# Patient Record
Sex: FEMALE | Race: BLACK OR AFRICAN AMERICAN | Employment: UNEMPLOYED | URBAN - METROPOLITAN AREA
[De-identification: names, ages, dates, MRNs, and addresses within clinical notes are randomized per-mention and may not be internally consistent; named-entity substitution may affect disease eponyms.]

---

## 2024-02-14 ENCOUNTER — OFFICE VISIT (OUTPATIENT)
Dept: PRIMARY CARE CLINIC | Age: 69
End: 2024-02-14
Payer: COMMERCIAL

## 2024-02-14 VITALS
WEIGHT: 186.6 LBS | RESPIRATION RATE: 15 BRPM | TEMPERATURE: 99.8 F | OXYGEN SATURATION: 98 % | HEIGHT: 64 IN | DIASTOLIC BLOOD PRESSURE: 72 MMHG | SYSTOLIC BLOOD PRESSURE: 138 MMHG | HEART RATE: 77 BPM | BODY MASS INDEX: 31.86 KG/M2

## 2024-02-14 DIAGNOSIS — Z13.220 SCREENING CHOLESTEROL LEVEL: ICD-10-CM

## 2024-02-14 DIAGNOSIS — Z12.11 SPECIAL SCREENING FOR MALIGNANT NEOPLASM OF COLON: ICD-10-CM

## 2024-02-14 DIAGNOSIS — J45.20 MILD INTERMITTENT EXTRINSIC ASTHMA WITHOUT COMPLICATION: Primary | ICD-10-CM

## 2024-02-14 DIAGNOSIS — Z11.59 ENCOUNTER FOR HEPATITIS C SCREENING TEST FOR LOW RISK PATIENT: ICD-10-CM

## 2024-02-14 DIAGNOSIS — Z13.1 DIABETES MELLITUS SCREENING: ICD-10-CM

## 2024-02-14 PROBLEM — J45.909 EXTRINSIC ASTHMA: Status: ACTIVE | Noted: 2024-02-14

## 2024-02-14 PROCEDURE — 1123F ACP DISCUSS/DSCN MKR DOCD: CPT | Performed by: FAMILY MEDICINE

## 2024-02-14 PROCEDURE — 99203 OFFICE O/P NEW LOW 30 MIN: CPT | Performed by: FAMILY MEDICINE

## 2024-02-14 RX ORDER — ASCORBIC ACID 500 MG
500 TABLET ORAL DAILY
COMMUNITY

## 2024-02-14 RX ORDER — B-COMPLEX WITH VITAMIN C
TABLET ORAL
COMMUNITY

## 2024-02-14 RX ORDER — ASPIRIN 81 MG/1
162 TABLET, CHEWABLE ORAL DAILY
COMMUNITY
End: 2024-02-14

## 2024-02-14 RX ORDER — CETIRIZINE HYDROCHLORIDE 10 MG/1
10 TABLET ORAL DAILY
COMMUNITY

## 2024-02-14 RX ORDER — ALBUTEROL SULFATE 90 UG/1
2 AEROSOL, METERED RESPIRATORY (INHALATION) EVERY 6 HOURS PRN
Qty: 18 G | Refills: 3 | Status: SHIPPED | OUTPATIENT
Start: 2024-02-14

## 2024-02-14 NOTE — PROGRESS NOTES
mouth daily, Disp: , Rfl:     Zinc 100 MG TABS, Take by mouth, Disp: , Rfl:     selenium 50 MCG tablet, Take 1 tablet by mouth daily, Disp: , Rfl:     Potassium 99 MG TABS, Take by mouth, Disp: , Rfl:     Magnesium 400 MG CAPS, Take by mouth, Disp: , Rfl:     Garlic 1000 MG CAPS, Take by mouth, Disp: , Rfl:     Folic Acid (FOLATE PO), Take by mouth, Disp: , Rfl:     albuterol sulfate HFA (PROVENTIL HFA) 108 (90 Base) MCG/ACT inhaler, Inhale 2 puffs into the lungs every 6 hours as needed for Wheezing, Disp: 18 g, Rfl: 3  Allergies   Allergen Reactions    Penicillins Anaphylaxis and Shortness Of Breath    Lactose Intolerance (Gi) Nausea Only     Review of Systems:  Review of Systems   Constitutional:  Negative for chills, diaphoresis and fever.   HENT:  Negative for congestion and rhinorrhea.    Respiratory:  Negative for cough and shortness of breath.    Cardiovascular:  Negative for chest pain.   Gastrointestinal:  Negative for abdominal pain, blood in stool, diarrhea, nausea and vomiting.   Genitourinary:  Negative for difficulty urinating, dysuria and hematuria.   Skin:  Negative for color change and rash.   Psychiatric/Behavioral:  Negative for dysphoric mood.      Objective:    Physical Exam:  Vitals:    02/14/24 1334   BP: 138/72   Site: Left Upper Arm   Position: Sitting   Cuff Size: Medium Adult   Pulse: 77   Resp: 15   Temp: 99.8 °F (37.7 °C)   TempSrc: Temporal   SpO2: 98%   Weight: 84.6 kg (186 lb 9.6 oz)   Height: 1.626 m (5' 4\")     Physical Exam  Constitutional:       General: She is not in acute distress.     Appearance: Normal appearance. She is not ill-appearing, toxic-appearing or diaphoretic.   HENT:      Head: Normocephalic and atraumatic.      Right Ear: External ear normal.      Left Ear: External ear normal.   Eyes:      General: No scleral icterus.  Cardiovascular:      Rate and Rhythm: Normal rate and regular rhythm.      Pulses: Normal pulses.      Heart sounds: Normal heart sounds. No

## 2024-02-14 NOTE — ASSESSMENT & PLAN NOTE
Well controlled. Denied recent H/I/E. Refilled albuterol inhaler. Offered flu and PCV20 vaccines - declined. Advised can always get this in the future. Call back/ED precautions discussed.

## 2024-02-14 NOTE — PATIENT INSTRUCTIONS
Please find our [facility] Lab Location Guide below for your convenience!    CENTRAL LOCATIONS    1) [facility]  [address]  [place], [place] [address]  Phone: [phone]    2) [facility]  [address], [place] [address]  Phone: [phone]    [facility] LOCATIONS    3) [facility]  [address]  [place], [place] [address]  Phone: [phone]    4) [facility] [place] [facility]  [address] [place] [address]  Phone: [phone]    5) [facility]  [address] [place] [address]  Phone: [phone]    [place] LOCATIONS    6) [facility]  [address]  [place] [address]  Phone: [phone]    7) [facility]  [address] [place] [address]  Phone: [phone]    8) [facility]  [address] [address]  Phone: [phone]    9) [facility]  [address]  [place] [address]  Phone: [phone]    [place] LOCATIONS    10) [facility]  [address]  [place] [address]  Phone: [phone]    11) [facility]  [address]  [place] [address]  Phone: [phone]    [facility] LOCATIONS    12) [facility]  [address]  [place] [address]  Phone: [phone]    13) [facility]  [address] [place] [address]  Phone: [phone]

## 2024-03-04 ENCOUNTER — PREP FOR PROCEDURE (OUTPATIENT)
Dept: SURGERY | Age: 69
End: 2024-03-04

## 2024-03-04 ENCOUNTER — TELEPHONE (OUTPATIENT)
Dept: SURGERY | Age: 69
End: 2024-03-04

## 2024-03-04 DIAGNOSIS — Z12.11 SPECIAL SCREENING FOR MALIGNANT NEOPLASMS, COLON: ICD-10-CM

## 2024-03-04 RX ORDER — POLYETHYLENE GLYCOL 3350, SODIUM SULFATE ANHYDROUS, SODIUM BICARBONATE, SODIUM CHLORIDE, POTASSIUM CHLORIDE 236; 22.74; 6.74; 5.86; 2.97 G/4L; G/4L; G/4L; G/4L; G/4L
4 POWDER, FOR SOLUTION ORAL ONCE
Qty: 4000 ML | Refills: 0 | Status: SHIPPED | OUTPATIENT
Start: 2024-03-04 | End: 2024-03-04

## 2024-03-04 NOTE — TELEPHONE ENCOUNTER
Patient has been scheduled for: Colonoscopy    Procedure: Colonoscopy  Date: 4/17/24  Time: 11:30am  Arrival:10:00am  Hospital: [facility]    ASA?: N/A  Prep? [facility]    Pre-op? N/A    Post-op Appt? N/A      Patient advised they will need a .    Orders faxed to surgery scheduling.    Medication sent to Pharmacy: [facility]    Stents or ostomy marking? N/A    Instructions have been mailed/emailed to:[address] [place] [id number]     Added to outlook calendar

## 2024-03-15 PROBLEM — Z13.220 SCREENING CHOLESTEROL LEVEL: Status: RESOLVED | Noted: 2024-02-14 | Resolved: 2024-03-15

## 2024-03-15 PROBLEM — Z13.1 DIABETES MELLITUS SCREENING: Status: RESOLVED | Noted: 2024-02-14 | Resolved: 2024-03-15

## 2024-03-15 PROBLEM — Z12.11 SPECIAL SCREENING FOR MALIGNANT NEOPLASM OF COLON: Status: RESOLVED | Noted: 2024-02-14 | Resolved: 2024-03-15

## 2024-03-15 PROBLEM — Z11.59 ENCOUNTER FOR HEPATITIS C SCREENING TEST FOR LOW RISK PATIENT: Status: RESOLVED | Noted: 2024-02-14 | Resolved: 2024-03-15

## 2024-04-03 PROBLEM — Z12.11 SPECIAL SCREENING FOR MALIGNANT NEOPLASMS, COLON: Status: RESOLVED | Noted: 2024-03-04 | Resolved: 2024-04-03

## 2024-04-16 ENCOUNTER — TELEPHONE (OUTPATIENT)
Dept: PRIMARY CARE CLINIC | Age: 69
End: 2024-04-16

## 2024-04-16 ENCOUNTER — TELEPHONE (OUTPATIENT)
Dept: SURGERY | Age: 69
End: 2024-04-16

## 2024-04-16 NOTE — TELEPHONE ENCOUNTER
Dr. Griffith's office called in stating that the patient is being asked to pay a large copay for the colonoscopy for tomorrow even though it is supposed to be a preventative screening    Dr. Griffith would like our office to contact the patient because she is having second thoughts about the colonoscopy and is thinking about holding off on the procedure.     Patient is supposed to have her colonoscopy tomorrow 4/17/2024    Please contact the patient at [phone]

## 2024-04-16 NOTE — TELEPHONE ENCOUNTER
Patient called back is all good to proceed with colonoscopy on 4/17. She declined coming in earlier and will arrive at 10:00 am.    no

## 2024-04-16 NOTE — TELEPHONE ENCOUNTER
Pt called stating she is supposed to have a colonoscopy tomorrow with Dr Moore but sts she has to pay quite a bit out of pocket and is wondering why.  Pt sts she would like a recommendation for another GI doctor to see if that provider and exam would be covered 100%.  Pt sts must have an answer ASAP for she is supposed to start the prep for the colonoscopy today.  Advised pt that Dr Griffith is not available at this time and we will reach out to her as soon as possible and call pt back.    Advised pt to call her insurance to see why she would owe that much money for the procedure.

## 2024-04-16 NOTE — TELEPHONE ENCOUNTER
Spoke with patient to confirm colonoscopy. She is ready except regarding the cost she will have to pay. She was given number to patient billing to confirm there will be no additional fees to initial cost  she was given. If there is no additional costs then she will proceed, if there are more costs then she will have to cancel. Patient will call office back to confirm what she will be doing.

## 2024-04-17 ENCOUNTER — HOSPITAL ENCOUNTER (OUTPATIENT)
Age: 69
Setting detail: OUTPATIENT SURGERY
Discharge: HOME | End: 2024-04-17
Attending: SURGERY | Admitting: SURGERY
Payer: COMMERCIAL

## 2024-04-17 ENCOUNTER — ANESTHESIA EVENT (OUTPATIENT)
Dept: ENDOSCOPY | Age: 69
End: 2024-04-17
Payer: COMMERCIAL

## 2024-04-17 ENCOUNTER — ANESTHESIA (OUTPATIENT)
Dept: ENDOSCOPY | Age: 69
End: 2024-04-17
Payer: COMMERCIAL

## 2024-04-17 VITALS
HEIGHT: 64 IN | TEMPERATURE: 97.2 F | HEART RATE: 93 BPM | SYSTOLIC BLOOD PRESSURE: 165 MMHG | OXYGEN SATURATION: 99 % | RESPIRATION RATE: 12 BRPM | BODY MASS INDEX: 30.39 KG/M2 | DIASTOLIC BLOOD PRESSURE: 97 MMHG | WEIGHT: 178 LBS

## 2024-04-17 DIAGNOSIS — Z12.11 SPECIAL SCREENING FOR MALIGNANT NEOPLASMS, COLON: Primary | ICD-10-CM

## 2024-04-17 PROCEDURE — 3700000001 HC ADD 15 MINUTES (ANESTHESIA): Performed by: SURGERY

## 2024-04-17 PROCEDURE — 45378 DIAGNOSTIC COLONOSCOPY: CPT | Performed by: SURGERY

## 2024-04-17 PROCEDURE — 3609008400 HC SIGMOIDOSCOPY DIAGNOSTIC: Performed by: SURGERY

## 2024-04-17 PROCEDURE — 2580000003 HC RX 258: Performed by: ANESTHESIOLOGY

## 2024-04-17 PROCEDURE — 3700000000 HC ANESTHESIA ATTENDED CARE: Performed by: SURGERY

## 2024-04-17 PROCEDURE — 7100000011 HC PHASE II RECOVERY - ADDTL 15 MIN: Performed by: SURGERY

## 2024-04-17 PROCEDURE — 6360000002 HC RX W HCPCS: Performed by: NURSE ANESTHETIST, CERTIFIED REGISTERED

## 2024-04-17 PROCEDURE — 7100000010 HC PHASE II RECOVERY - FIRST 15 MIN: Performed by: SURGERY

## 2024-04-17 RX ORDER — LIDOCAINE HYDROCHLORIDE 20 MG/ML
INJECTION, SOLUTION INTRAVENOUS PRN
Status: DISCONTINUED | OUTPATIENT
Start: 2024-04-17 | End: 2024-04-17 | Stop reason: SDUPTHER

## 2024-04-17 RX ORDER — PROPOFOL 10 MG/ML
INJECTION, EMULSION INTRAVENOUS PRN
Status: DISCONTINUED | OUTPATIENT
Start: 2024-04-17 | End: 2024-04-17 | Stop reason: SDUPTHER

## 2024-04-17 RX ORDER — SODIUM CHLORIDE, SODIUM LACTATE, POTASSIUM CHLORIDE, CALCIUM CHLORIDE 600; 310; 30; 20 MG/100ML; MG/100ML; MG/100ML; MG/100ML
INJECTION, SOLUTION INTRAVENOUS CONTINUOUS
Status: DISCONTINUED | OUTPATIENT
Start: 2024-04-17 | End: 2024-04-17 | Stop reason: HOSPADM

## 2024-04-17 RX ADMIN — PROPOFOL 40 MG: 10 INJECTION, EMULSION INTRAVENOUS at 12:02

## 2024-04-17 RX ADMIN — PROPOFOL 50 MG: 10 INJECTION, EMULSION INTRAVENOUS at 12:05

## 2024-04-17 RX ADMIN — PROPOFOL 125 MCG/KG/MIN: 10 INJECTION, EMULSION INTRAVENOUS at 12:04

## 2024-04-17 RX ADMIN — SODIUM CHLORIDE, POTASSIUM CHLORIDE, SODIUM LACTATE AND CALCIUM CHLORIDE: 600; 310; 30; 20 INJECTION, SOLUTION INTRAVENOUS at 11:04

## 2024-04-17 RX ADMIN — PROPOFOL 80 MG: 10 INJECTION, EMULSION INTRAVENOUS at 11:59

## 2024-04-17 RX ADMIN — LIDOCAINE HYDROCHLORIDE 100 MG: 20 INJECTION, SOLUTION INTRAVENOUS at 11:59

## 2024-04-17 ASSESSMENT — PAIN SCALES - GENERAL
PAINLEVEL_OUTOF10: 0

## 2024-04-17 ASSESSMENT — PAIN - FUNCTIONAL ASSESSMENT: PAIN_FUNCTIONAL_ASSESSMENT: NONE - DENIES PAIN

## 2024-04-17 NOTE — PROGRESS NOTES
From ENDO post procedure PROCEDURES  SIGMOIDOSCOPY DIAGNOSTIC FLEXIBLE  Raúl Stover MD  Out of OR, ENDO   Report received from ENDO RN at bedside.   VS stable.   Patient denies any pain or nausea.    at bedside- Dr. Stover called and left message with family.

## 2024-04-17 NOTE — TELEPHONE ENCOUNTER
Per Dr Stover's office, their office called and spoke with pt.  All questions answered and pt will proceed with having colonoscopy performed.

## 2024-04-17 NOTE — ANESTHESIA PRE PROCEDURE
COVID-19 2021    IBS (irritable bowel syndrome)     Macular hole     Left - sees eye doctor this       Past Surgical History:        Procedure Laterality Date    APPENDECTOMY       SECTION      X3    ENDOMETRIAL ABLATION      For heavy bleeding    REFRACTIVE SURGERY         Social History:    Social History     Tobacco Use    Smoking status: Former     Current packs/day: 0.00     Average packs/day: 0.3 packs/day for 7.0 years (2.1 ttl pk-yrs)     Types: Cigarettes     Start date:      Quit date:      Years since quittin.3    Smokeless tobacco: Never   Substance Use Topics    Alcohol use: Not Currently     Comment: 2 shots of Scotch per week                                Counseling given: Not Answered      Vital Signs (Current): There were no vitals filed for this visit.                                           BP Readings from Last 3 Encounters:   24 138/72       NPO Status:                                                                                 BMI:   Wt Readings from Last 3 Encounters:   24 84.6 kg (186 lb 9.6 oz)     There is no height or weight on file to calculate BMI.    CBC: No results found for: \"WBC\", \"RBC\", \"HGB\", \"HCT\", \"MCV\", \"RDW\", \"PLT\"    CMP: No results found for: \"NA\", \"K\", \"CL\", \"CO2\", \"BUN\", \"CREATININE\", \"GFRAA\", \"AGRATIO\", \"LABGLOM\", \"GLUCOSE\", \"GLU\", \"PROT\", \"CALCIUM\", \"BILITOT\", \"ALKPHOS\", \"AST\", \"ALT\"    POC Tests: No results for input(s): \"POCGLU\", \"POCNA\", \"POCK\", \"POCCL\", \"POCBUN\", \"POCHEMO\", \"POCHCT\" in the last 72 hours.    Coags: No results found for: \"PROTIME\", \"INR\", \"APTT\"    HCG (If Applicable): No results found for: \"PREGTESTUR\", \"PREGSERUM\", \"HCG\", \"HCGQUANT\"     ABGs: No results found for: \"PHART\", \"PO2ART\", \"HZK2TDM\", \"QLO7UAX\", \"BEART\", \"J8HHRUAQ\"     Type & Screen (If Applicable):  No results found for: \"LABABO\", \"LABRH\"    Drug/Infectious Status (If Applicable):  No results found for: \"HIV\", \"HEPCAB\"    COVID-19 Screening (If

## 2024-04-17 NOTE — H&P
SECTION      X3    ENDOMETRIAL ABLATION      For heavy bleeding    REFRACTIVE SURGERY         Physical Exam:     BP (!) 153/75   Pulse 67   Temp 97.7 °F (36.5 °C) (Temporal)   Resp 17   Ht 1.626 m (5' 4\")   Wt 80.7 kg (178 lb)   SpO2 100%   BMI 30.55 kg/m²  Body mass index is 30.55 kg/m².  Constitutional: Appears well-developed and well-nourished. Grooming appropriate.  Head: Normocephalic, atraumatic.   Eyes: No scleral icterus. Vision intact grossly.  ENT: Hearing grossly intact. No facial deformity.  Cardiovascular: Normal rate on monitor.  Pulmonary/Chest: Effort normal. No respiratory distress. No wheezes. No use of accessory muscles.  Musculoskeletal: Normal range of motion of UE. No gross deformity.   Neurological: Alert and oriented to person, place, and time. No gross deficits.  Psychiatric: Normal mood and affect. Behavior normal. Oriented to person, place, and time.  Abdomen: soft, NTTP, non distended    Recent labs/radiology reviewed as appropriate    Assessment/Plan:     Previous colonoscopy: yes - 6 yrs ago - (+) polyp  Family history of colorectal cancer: no  Symptoms: no    Anesthesia to provide sedation. Please see their documentation regarding airway and ASA classification.    Proceed as planned for endoscopy, possible polypectomy    Risks/benefits/alternatives of procedure discussed with patient and any present family members. Risks including, but not limited to: bleeding, perforation, post polypectomy syndrome, splenic injury, need for additional procedures or surgery, risks of anesthesia. Patient understands it is their responsibility to call office for pathology results if they do not hear from my office within 1-2 weeks. All questions answered.    Electronically signed by Raúl Stover MD on 2024 at 11:58 AM

## 2024-04-17 NOTE — ANESTHESIA POSTPROCEDURE EVALUATION
Department of Anesthesiology  Postprocedure Note    Patient: Nargis Davis  MRN: [id number]  YOB: 1955  Date of evaluation: 4/17/2024    Procedure Summary       Date: 04/17/24 Room / Location: [place] [address] / [facility]    Anesthesia Start: 1155 Anesthesia Stop: 1213    Procedure: SIGMOIDOSCOPY DIAGNOSTIC FLEXIBLE Diagnosis:       Special screening for malignant neoplasms, colon      (Special screening for malignant neoplasms, colon [Z12.11])    Surgeons: Raúl Stoevr MD Responsible Provider: Hernando Aquino MD    Anesthesia Type: MAC ASA Status: 2            Anesthesia Type: No value filed.    Sumeet Phase I: Sumeet Score: 10    Sumeet Phase II: Sumeet Score: 10    Anesthesia Post Evaluation    Patient location during evaluation: PACU  Patient participation: complete - patient participated  Level of consciousness: awake  Pain score: 0  Airway patency: patent  Nausea & Vomiting: no nausea  Cardiovascular status: hemodynamically stable  Respiratory status: acceptable  Hydration status: stable  Pain management: satisfactory to patient    No notable events documented.

## 2024-04-17 NOTE — PROGRESS NOTES
Discharge instructions given to patient and  at beside.   Patient instructed to follow up with scheduling a CT scan at [facility] facility and officew can assist with scheduling. Patient and family voices understanding of follow up care.     IV dc/d, dressed and discharged home vi wc to car-  driving her home.

## 2024-04-18 ENCOUNTER — TELEPHONE (OUTPATIENT)
Dept: SURGERY | Age: 69
End: 2024-04-18

## 2024-04-18 NOTE — TELEPHONE ENCOUNTER
Spoke to [facility] to see if they do virtual colonoscopies, they do them but only at the [place] location. Order faxed to [email or web address]. Patient notified order has been faxed and to call Dr. Stover's office if [facility] hasn't reached out by next Wednesday.

## 2024-04-18 NOTE — TELEPHONE ENCOUNTER
Patient called stating yesterday Dr. Stover instructed her to have a CT done, she called [facility] to schedule and they do not have an order for her.

## 2024-04-30 ENCOUNTER — TELEPHONE (OUTPATIENT)
Dept: SURGERY | Age: 69
End: 2024-04-30

## 2024-04-30 ENCOUNTER — OFFICE VISIT (OUTPATIENT)
Dept: SURGERY | Age: 69
End: 2024-04-30
Payer: COMMERCIAL

## 2024-04-30 VITALS
RESPIRATION RATE: 16 BRPM | HEART RATE: 66 BPM | SYSTOLIC BLOOD PRESSURE: 152 MMHG | BODY MASS INDEX: 31.41 KG/M2 | OXYGEN SATURATION: 100 % | TEMPERATURE: 98.2 F | DIASTOLIC BLOOD PRESSURE: 79 MMHG | WEIGHT: 183 LBS

## 2024-04-30 DIAGNOSIS — K60.2 ANAL FISSURE: Primary | ICD-10-CM

## 2024-04-30 DIAGNOSIS — Z12.11 SPECIAL SCREENING FOR MALIGNANT NEOPLASMS, COLON: ICD-10-CM

## 2024-04-30 PROCEDURE — 99204 OFFICE O/P NEW MOD 45 MIN: CPT | Performed by: SURGERY

## 2024-04-30 PROCEDURE — 1123F ACP DISCUSS/DSCN MKR DOCD: CPT | Performed by: SURGERY

## 2024-04-30 NOTE — PROGRESS NOTES
EXAM:    Chaperone/MA present in room during entire exam. Patient was placed in lateral or knee-chest positioning depending on comfort. Exam table manipulated for proper visualization and lighting. Buttocks spread.     Inspection reveals: posterior midline fissure confirmed by cotton tip swab palpation    Digital exam and anoscopy deferred due to acute pain      Labs: none  Radiology: none    Last colonoscopy: FLex sig 2 wks ago    Date of last Colonoscopy: 4/17/2024   No cologuard on file  No FIT/FOBT on file   Date of last flexible sigmoidoscopy: 4/17/2024       Assessment/Plan:     A/P:  New problem(s): Anal fissure  Established problem(s): Aborted colonoscopy screening due to tortuous sigmoid colon  Additional workup/treatment planned: Medical therapy with prescription calcium channel blocker ointment, fiber supplementation, sitz baths, improving dietary and lifestyle choices  Risk of complications/morbidity: moderate    Patient has signs and symptoms consistent with anal fissure.  Exam reveals posterior midline acute anal fissure with internal sphincter spasm.  We will start with conservative management, including fiber supplementation, water, healthy fruits and vegetables, and medical management with prescription topical calcium channel blocker ointment.  Discussed the use of the prescription ointment and that it will need to be obtained from a compounding pharmacy, which my office will arrange.  If symptoms do not improve in 6-8 weeks, patient may require more invasive treatment options, such as Botox injection, partial sphincterotomy, or fissurectomy with anocutaneous advancement flap. We briefly discussed operative risks of these various options.    She also has a CT colonoscopy scheduled in the next few weeks, so hopefully her symptoms will be improved by then in order to tolerate the bowel prep.    I provided written information in the After Visit Summary AVS Regarding: Anal fissure    DISPOSITION:  F/u

## 2024-04-30 NOTE — PATIENT INSTRUCTIONS
Anal Fissure: Information and Care Instructions        An anal fissure is a tear in the lining of the anus. It typically causes intense, sharp pain and a small amount of bleeding. You may notice bright red blood on the toilet paper after you wipe. A fissure may form if you are constipated and try to pass a large, hard stool, or have excessive diarrhea. Some fissures form despite regular, soft stools. Some are due to trauma. Rarely, fissures can be a sign of other diseases such as Crohn's disease, infections, or cancer.    In 50% of patients, anal fissure will heal by addressing the underlying problem and avoiding additional hard stool and constipation. See below for recommendations.    In the other 50% of patients, anal fissures are resistant to healing due to spasm (abnormal tightening) of the internal sphincter muscle. This part of the anal muscles is under automatic control, so you may not feel the spasm. Most treatments attempt to break the cycle of spasm and pain by relaxing the internal sphincter muscle. This can be done with medication, Botox injection, and occasionally with surgery.     Anal fissures themselves do not lead to cancer or other serious illnesses, however undiagnosed rectal bleeding can be a sign of other problems in the colon and rectum, such as hemorrhoids, infections, polyps, or even cancers. If bleeding is excessive, mixed with stool, or ongoing after healing of the fissure, or you have a family history of cancer, colonoscopy may be recommended.    How to treat constipation and avoid straining:  Avoid constipation:  Include fruits, vegetables, beans, and whole grains in your diet each day. These foods are high in fiber.  Take a fiber supplement, such as Benefiber, Citrucel, or Metamucil, every day. Read and follow all instructions on the label.   Drink plenty of fluids, enough so that your urine is light yellow or clear like water. If you have kidney, heart, or liver disease and have to

## 2024-04-30 NOTE — TELEPHONE ENCOUNTER
Spoke to Pharmacist at [facility] to order CCB ointment consisting of Nifedipine 0.3%/Lidocaine 5% 30 grams with instructions to apply a pea size amount BID and 2 additional refills.

## 2024-05-06 DIAGNOSIS — Z13.220 SCREENING CHOLESTEROL LEVEL: ICD-10-CM

## 2024-05-06 DIAGNOSIS — Z13.1 DIABETES MELLITUS SCREENING: ICD-10-CM

## 2024-05-06 DIAGNOSIS — Z11.59 ENCOUNTER FOR HEPATITIS C SCREENING TEST FOR LOW RISK PATIENT: ICD-10-CM

## 2024-05-07 LAB
ALBUMIN SERPL-MCNC: 4.5 G/DL (ref 3.4–5)
ALBUMIN/GLOB SERPL: 1.6 {RATIO} (ref 1.1–2.2)
ALP SERPL-CCNC: 86 U/L (ref 40–129)
ALT SERPL-CCNC: 22 U/L (ref 10–40)
ANION GAP SERPL CALCULATED.3IONS-SCNC: 12 MMOL/L (ref 3–16)
AST SERPL-CCNC: 20 U/L (ref 15–37)
BILIRUB SERPL-MCNC: <0.2 MG/DL (ref 0–1)
BUN SERPL-MCNC: 11 MG/DL (ref 7–20)
CALCIUM SERPL-MCNC: 10.3 MG/DL (ref 8.3–10.6)
CHLORIDE SERPL-SCNC: 103 MMOL/L (ref 99–110)
CHOLEST SERPL-MCNC: 327 MG/DL (ref 0–199)
CO2 SERPL-SCNC: 26 MMOL/L (ref 21–32)
CREAT SERPL-MCNC: 0.8 MG/DL (ref 0.6–1.2)
EST. AVERAGE GLUCOSE BLD GHB EST-MCNC: 105.4 MG/DL
GFR SERPLBLD CREATININE-BSD FMLA CKD-EPI: 81 ML/MIN/{1.73_M2}
GLUCOSE SERPL-MCNC: 99 MG/DL (ref 70–99)
HBA1C MFR BLD: 5.3 %
HCV AB SERPL QL IA: NORMAL
HDLC SERPL-MCNC: 80 MG/DL (ref 40–60)
LDLC SERPL CALC-MCNC: 220 MG/DL
POTASSIUM SERPL-SCNC: 4.1 MMOL/L (ref 3.5–5.1)
PROT SERPL-MCNC: 7.4 G/DL (ref 6.4–8.2)
SODIUM SERPL-SCNC: 141 MMOL/L (ref 136–145)
TRIGL SERPL-MCNC: 134 MG/DL (ref 0–150)
VLDLC SERPL CALC-MCNC: 27 MG/DL

## 2024-05-09 PROBLEM — E78.49 OTHER HYPERLIPIDEMIA: Status: ACTIVE | Noted: 2024-05-09

## 2024-05-09 PROBLEM — Z53.20 STATIN DECLINED: Status: ACTIVE | Noted: 2024-05-09

## 2024-05-13 ENCOUNTER — TELEPHONE (OUTPATIENT)
Dept: SURGERY | Age: 69
End: 2024-05-13

## 2024-05-13 NOTE — TELEPHONE ENCOUNTER
Zoie with [facility] states patient is scheduled for a Virtual Colonoscopy on 05/14/2024, and [facility] needs the office notes and symptom information from the patient visits on:     04/17/2024 - COLONOSCOPY, POSSIBLE POLYPECTOMY [[id number]] and 04/30/2024 - hemorrhoids - possible RBL.    Zoie requests for the information requested above to be faxed to [phone] yanely Lino.     Zoie can be reached at [phone].    Zoie requested for the call to be marked as high priority due to the patient being scheduled in with [facility] 05/14/2024.

## 2024-05-14 ENCOUNTER — TELEPHONE (OUTPATIENT)
Dept: SURGERY | Age: 69
End: 2024-05-14

## 2024-05-14 NOTE — TELEPHONE ENCOUNTER
Patient called in stating that Dr. Stover needs to file an authorization for her CT scan that she had today 5/14/2024    Patient states that [facility] is stating that the CT was done out of network, so an authorization is needed from the provider     Please contact the patient at [phone]

## 2024-05-17 PROBLEM — Z12.11 SPECIAL SCREENING FOR MALIGNANT NEOPLASMS, COLON: Status: RESOLVED | Noted: 2024-03-04 | Resolved: 2024-05-17

## 2024-06-04 ENCOUNTER — TELEPHONE (OUTPATIENT)
Dept: SURGERY | Age: 69
End: 2024-06-04

## 2024-06-04 NOTE — TELEPHONE ENCOUNTER
Spoke to patient to inform her that per Dr. Stover she should have a virtual colonoscopy in 5 years so she is due May 2029. She also stated she had a denial for her virtual colonoscopy from insurance. Our office has not received a denial letter so she was instructed to either bring the letter to our office or fax it to us so we can start an appeal. Patient verbalized understanding and will call back if she has any other questions.